# Patient Record
Sex: MALE | Race: WHITE | NOT HISPANIC OR LATINO | URBAN - METROPOLITAN AREA
[De-identification: names, ages, dates, MRNs, and addresses within clinical notes are randomized per-mention and may not be internally consistent; named-entity substitution may affect disease eponyms.]

---

## 2024-01-01 ENCOUNTER — HOSPITAL ENCOUNTER (EMERGENCY)
Facility: HOSPITAL | Age: 64
End: 2024-04-22
Attending: STUDENT IN AN ORGANIZED HEALTH CARE EDUCATION/TRAINING PROGRAM

## 2024-01-01 DIAGNOSIS — I46.9 CARDIOPULMONARY ARREST (MULTI): Primary | ICD-10-CM

## 2024-01-01 PROCEDURE — 99285 EMERGENCY DEPT VISIT HI MDM: CPT | Mod: 25

## 2024-01-01 PROCEDURE — 92950 HEART/LUNG RESUSCITATION CPR: CPT

## 2024-04-22 NOTE — ED PROVIDER NOTES
HPI   No chief complaint on file.      Patient is a 63-year-old male presenting to the emergency department in full arrest.  Please refer to prehospital code sheet from EMS for exact intervention as well as our code documentation sheet here in the ED.     EMS stated patient had been found down by his brother at home.  Patient reportedly has not seen a doctor in years.    He had been down for about 40 minutes prior to arrival.    Initial presenting rhythm is asystole without any visualized cardiac activity.        History provided by:  Patient, relative and EMS personnel  History limited by:  Acuity of condition   used: No                        No data recorded                   Patient History   No past medical history on file.  No past surgical history on file.  No family history on file.  Social History     Tobacco Use   • Smoking status: Not on file   • Smokeless tobacco: Not on file   Substance Use Topics   • Alcohol use: Not on file   • Drug use: Not on file       Physical Exam   ED Triage Vitals   Temp Pulse Resp BP   -- -- -- --      SpO2 Temp src Heart Rate Source Patient Position   -- -- -- --      BP Location FiO2 (%)     -- --       Physical Exam  Appearance: supraglottic airway in place, kinga device in place, CPR being performed  Skin: Intact, dry skin, no lesions, rash, petechiae or purpura. cool skin  Eyes: pupils fixed and not reactive bilaterally  ENT: airway in place, dry MM  Neck: Supple, Trachea at midline.  Pulmonary: breath sounds bilaterally with bagging, equal chest wall rise  Cardiac: no pulses without CPR in progress, cool extremities, no cardiac activity on bedside US  Abdomen: Soft, nondistended, no bowel sounds, no signs of trauma/ecchymosis  Musculoskeletal: no edema or deformity  Neurological:  unresponsive, GCS 3, no spontaneous movement  ED Course & MDM   Diagnoses as of 04/22/24 1258   Cardiopulmonary arrest (Multi)       Medical Decision Making  Patient is a  63-year-old male presenting to the emergency department in full arrest.  Patient's family works in the hospital and niece came down prior to patient's arrival.  She did state that patient would likely be DNR as he does not follow with physicians and would not want intensive interventions.  Patient's brother who would be his POA did arrive shortly after and after additional conversation the decision was made for patient to be made comfort care. For the duration of our code the patient remained in asystole.  Supraglottic airway in place with good end-tidal waveform and good bilateral breath sounds. Time of death was called at 1247 family at bedside and updated.    Procedure  Critical Care    Performed by: Iban Byers MD  Authorized by: Iban Byers MD    Critical care provider statement:     Critical care time (minutes):  15    Critical care time was exclusive of:  Separately billable procedures and treating other patients    Critical care was necessary to treat or prevent imminent or life-threatening deterioration of the following conditions:  Cardiac failure and respiratory failure    Critical care was time spent personally by me on the following activities:  Development of treatment plan with patient or surrogate, obtaining history from patient or surrogate, evaluation of patient's response to treatment and examination of patient       Iban Byers MD  04/22/24 5906

## 2024-04-22 NOTE — ED NOTES
1205 Pt found down in bathroom by brother... CPR initiated by brother.  1215 EMS arrival, IO started with 4 total rounds of EPI administered (last EPI 1241)  Glucose 168  1243 Arrival to ED... Lucus still in place.  1247 TOD called  by Dr. Byers.     Zahra Rosado, RN  04/22/24 5189

## 2024-04-22 NOTE — ED TRIAGE NOTES
"Pt BIBA from home in full cardiac arrest. Pt found down in bathroom by brother at 1205 on this day and called \"911\".  "